# Patient Record
Sex: MALE | Race: WHITE | NOT HISPANIC OR LATINO | URBAN - METROPOLITAN AREA
[De-identification: names, ages, dates, MRNs, and addresses within clinical notes are randomized per-mention and may not be internally consistent; named-entity substitution may affect disease eponyms.]

---

## 2024-07-24 ENCOUNTER — OFFICE VISIT (OUTPATIENT)
Dept: URGENT CARE | Facility: CLINIC | Age: 29
End: 2024-07-24
Payer: COMMERCIAL

## 2024-07-24 VITALS
HEART RATE: 83 BPM | TEMPERATURE: 98.7 F | SYSTOLIC BLOOD PRESSURE: 131 MMHG | WEIGHT: 173 LBS | BODY MASS INDEX: 22.93 KG/M2 | HEIGHT: 73 IN | RESPIRATION RATE: 20 BRPM | OXYGEN SATURATION: 99 % | DIASTOLIC BLOOD PRESSURE: 87 MMHG

## 2024-07-24 DIAGNOSIS — K11.21 ACUTE PAROTITIS: ICD-10-CM

## 2024-07-24 DIAGNOSIS — J02.9 PHARYNGITIS, UNSPECIFIED ETIOLOGY: Primary | ICD-10-CM

## 2024-07-24 LAB — S PYO AG THROAT QL: NEGATIVE

## 2024-07-24 PROCEDURE — 87880 STREP A ASSAY W/OPTIC: CPT | Performed by: PHYSICIAN ASSISTANT

## 2024-07-24 PROCEDURE — G0382 LEV 3 HOSP TYPE B ED VISIT: HCPCS | Performed by: PHYSICIAN ASSISTANT

## 2024-07-24 NOTE — LETTER
July 24, 2024     Patient: Keaton Burns   YOB: 1995   Date of Visit: 7/24/2024       To Whom It May Concern:    Patient seen in office today for acute illness.  No work until without fever for 24 hours without having to take anti fever medication.          Sincerely,        Seiln Gotti PA-C    CC: No Recipients

## 2024-07-24 NOTE — PROGRESS NOTES
[facility]    NAME: Keaton Burns is a 29 y.o. male  : 1995    MRN: [id number]  DATE: 2024  TIME: 2:35 PM    Assessment and Plan   Pharyngitis, unspecified etiology [J02.9]  1. Pharyngitis, unspecified etiology  POCT rapid ANTIGEN strepA      2. Acute parotitis            Patient Instructions     Patient Instructions   Rapid strep test is negative.  No antibiotic indicated at this time.    Symptoms of bilateral swelling of parotid glands often are associated with viral respiratory illnesses that could include mumps, Mejia-Barr virus, parainfluenza, adenovirus, COVID and other viral etiologies.    Treatment is symptomatic.  Tylenol or ibuprofen as needed for fevers.  Rest.  Hydration.    Transmission precautions advised.    Follow-up with primary care for further evaluation as needed.    Chief Complaint     Chief Complaint   Patient presents with    Sore Throat     Cheek and facial pain started last week followed by swelling. Pt reports sore throat started last night.       History of Present Illness   Keaton Burns presents to the clinic c/o  29-year-old male comes in with complaint of fever and sore throat and had swollen saliva glands on his cheeks.    Started: Saturday noted swelling left saliva gland with soreness and dryness.  Next day right side was swollen and sore.    Associated signs and symptoms: No nasal congestion drainage postnasal drip.  Last night started to noted some discomfort with swallowing.  Has run low-grade fever.  Modifying factors: Nothing.  Known Exposures: None known however does go down to [facility] with his girlfriend.  Recent travel:  No.  Just [facility].  Hx asthma:  No.  Hx pneumonia:  No.  Smoker: No.    History of paroxysmal atrial fibrillation.  Follows with cardiologist.        Review of Systems   Review of Systems   Constitutional:  Positive for activity change, appetite change, fatigue and fever. Negative for chills and  "diaphoresis.   HENT:  Positive for facial swelling and sore throat. Negative for congestion, ear discharge, ear pain, postnasal drip, rhinorrhea, sinus pain and sneezing.    Eyes: Negative.    Respiratory: Negative.     Cardiovascular: Negative.    Hematological:  Positive for adenopathy.       Current Medications     No long-term medications on file.       Current Allergies     Allergies as of 07/24/2024    (No Known Allergies)          The following portions of the patient's history were reviewed and updated as appropriate: allergies, current medications, past family history, past medical history, past social history, past surgical history and problem list.  History reviewed. No pertinent past medical history.  History reviewed. No pertinent surgical history.  History reviewed. No pertinent family history.    Objective   /87   Pulse 83   Temp 98.7 °F (37.1 °C)   Resp 20   Ht 6' 1\" (1.854 m)   Wt 78.5 kg (173 lb)   SpO2 99%   BMI 22.82 kg/m²   No LMP for male patient.       Physical Exam     Physical Exam  Vitals and nursing note reviewed.   Constitutional:       General: He is not in acute distress.     Appearance: He is not ill-appearing, toxic-appearing or diaphoretic.   HENT:      Head: Normocephalic and atraumatic.      Right Ear: Tympanic membrane and ear canal normal. No drainage, swelling or tenderness. No middle ear effusion. Tympanic membrane is not erythematous.      Left Ear: Tympanic membrane and ear canal normal. No drainage, swelling or tenderness.  No middle ear effusion. Tympanic membrane is not erythematous.      Mouth/Throat:      Mouth: Mucous membranes are moist.      Pharynx: Uvula midline. No pharyngeal swelling, oropharyngeal exudate, posterior oropharyngeal erythema or uvula swelling.      Tonsils: No tonsillar exudate or tonsillar abscesses. 0 on the right. 0 on the left.      Comments: No abnormal lesions noted at parotid ducts bilaterally.  Slight fullness right parotid " gland with mild TTP inferior aspect.  No palpable nodules.  Eyes:      Extraocular Movements:      Right eye: Normal extraocular motion.      Left eye: Normal extraocular motion.      Conjunctiva/sclera: Conjunctivae normal.      Pupils: Pupils are equal, round, and reactive to light.   Neck:      Thyroid: No thyromegaly.   Cardiovascular:      Rate and Rhythm: Normal rate.   Pulmonary:      Effort: Pulmonary effort is normal.   Musculoskeletal:      Cervical back: Normal range of motion and neck supple.   Lymphadenopathy:      Cervical: No cervical adenopathy.   Skin:     General: Skin is warm and dry.      Findings: No erythema or rash.   Neurological:      General: No focal deficit present.      Mental Status: He is alert and oriented to person, place, and time.   Psychiatric:         Mood and Affect: Mood normal.         Behavior: Behavior normal.

## 2024-07-24 NOTE — PATIENT INSTRUCTIONS
Rapid strep test is negative.  No antibiotic indicated at this time.    Symptoms of bilateral swelling of parotid glands often are associated with viral respiratory illnesses that could include mumps, Mejia-Barr virus, parainfluenza, adenovirus, COVID and other viral etiologies.    Treatment is symptomatic.  Tylenol or ibuprofen as needed for fevers.  Rest.  Hydration.    Transmission precautions advised.    Follow-up with primary care for further evaluation as needed.